# Patient Record
Sex: MALE | Race: WHITE | HISPANIC OR LATINO
[De-identification: names, ages, dates, MRNs, and addresses within clinical notes are randomized per-mention and may not be internally consistent; named-entity substitution may affect disease eponyms.]

---
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
## 2022-01-21 ENCOUNTER — APPOINTMENT (OUTPATIENT)
Dept: INTERNAL MEDICINE | Facility: CLINIC | Age: 22
End: 2022-01-21
Payer: MEDICAID

## 2022-01-21 VITALS
HEART RATE: 109 BPM | HEIGHT: 68 IN | WEIGHT: 130 LBS | SYSTOLIC BLOOD PRESSURE: 123 MMHG | TEMPERATURE: 97.7 F | OXYGEN SATURATION: 97 % | BODY MASS INDEX: 19.7 KG/M2 | DIASTOLIC BLOOD PRESSURE: 79 MMHG

## 2022-01-21 DIAGNOSIS — R09.89 OTHER SPECIFIED SYMPTOMS AND SIGNS INVOLVING THE CIRCULATORY AND RESPIRATORY SYSTEMS: ICD-10-CM

## 2022-01-21 PROCEDURE — 90686 IIV4 VACC NO PRSV 0.5 ML IM: CPT

## 2022-01-21 PROCEDURE — 99395 PREV VISIT EST AGE 18-39: CPT | Mod: 25

## 2022-01-21 PROCEDURE — G0008: CPT

## 2022-01-21 NOTE — ASSESSMENT
[FreeTextEntry1] : Blood work done today, mucus pooling may be secondary to postnasal drip less likely GERD previous ENT note reviewed advised to continue with Flonase will attempt trial of PPI as well.\par Influenza administered patient tolerated procedure well

## 2022-01-21 NOTE — PHYSICAL EXAM
[Urethral Meatus] : meatus normal [Urinary Bladder Findings] : the bladder was normal on palpation [Scrotum] : the scrotum was normal [Rectal Exam - Seminal Vesicles] : the seminal vesicles were normal [Epididymis] : the epididymides were normal [Testes Tenderness] : no tenderness of the testes [Testes Mass (___cm)] : there were no testicular masses [Normal] : no joint swelling and grossly normal strength and tone

## 2022-01-21 NOTE — HISTORY OF PRESENT ILLNESS
[FreeTextEntry1] : 21-year-old male presents to the office for annual physical [de-identified] : Has been having mucus in the throat, states green sometimes difficult to break up\par Denies any fevers chills night sweats\par Denies any reflux does have some allergies\par Denies any difficulty breathing coughing wheezing

## 2022-01-27 ENCOUNTER — TRANSCRIPTION ENCOUNTER (OUTPATIENT)
Age: 22
End: 2022-01-27

## 2022-01-27 LAB
25(OH)D3 SERPL-MCNC: 24.6 NG/ML
ALBUMIN SERPL ELPH-MCNC: 5 G/DL
ALP BLD-CCNC: 126 U/L
ALT SERPL-CCNC: 10 U/L
ANION GAP SERPL CALC-SCNC: 18 MMOL/L
APPEARANCE: CLEAR
APPEARANCE: CLEAR
AST SERPL-CCNC: 16 U/L
BACTERIA: NEGATIVE
BASOPHILS # BLD AUTO: 0.01 K/UL
BASOPHILS NFR BLD AUTO: 0.2 %
BILIRUB SERPL-MCNC: 0.4 MG/DL
BILIRUBIN URINE: NEGATIVE
BILIRUBIN URINE: NEGATIVE
BLOOD URINE: NEGATIVE
BLOOD URINE: NEGATIVE
BUN SERPL-MCNC: 10 MG/DL
CALCIUM SERPL-MCNC: 9.8 MG/DL
CHLORIDE SERPL-SCNC: 105 MMOL/L
CHOLEST SERPL-MCNC: 145 MG/DL
CO2 SERPL-SCNC: 21 MMOL/L
COLOR: COLORLESS
COLOR: COLORLESS
CREAT SERPL-MCNC: 0.89 MG/DL
EOSINOPHIL # BLD AUTO: 0.07 K/UL
EOSINOPHIL NFR BLD AUTO: 1.1 %
ESTIMATED AVERAGE GLUCOSE: 97 MG/DL
GLUCOSE QUALITATIVE U: NEGATIVE
GLUCOSE QUALITATIVE U: NEGATIVE
GLUCOSE SERPL-MCNC: 89 MG/DL
HBA1C MFR BLD HPLC: 5 %
HCT VFR BLD CALC: 49.9 %
HDLC SERPL-MCNC: 65 MG/DL
HGB BLD-MCNC: 16.4 G/DL
HYALINE CASTS: 0 /LPF
IMM GRANULOCYTES NFR BLD AUTO: 0.2 %
KETONES URINE: NEGATIVE
KETONES URINE: NEGATIVE
LDLC SERPL CALC-MCNC: 67 MG/DL
LEUKOCYTE ESTERASE URINE: NEGATIVE
LEUKOCYTE ESTERASE URINE: NEGATIVE
LYMPHOCYTES # BLD AUTO: 1.97 K/UL
LYMPHOCYTES NFR BLD AUTO: 30.8 %
MAN DIFF?: NORMAL
MCHC RBC-ENTMCNC: 30.4 PG
MCHC RBC-ENTMCNC: 32.9 GM/DL
MCV RBC AUTO: 92.6 FL
MICROSCOPIC-UA: NORMAL
MONOCYTES # BLD AUTO: 0.52 K/UL
MONOCYTES NFR BLD AUTO: 8.1 %
NEUTROPHILS # BLD AUTO: 3.81 K/UL
NEUTROPHILS NFR BLD AUTO: 59.6 %
NITRITE URINE: NEGATIVE
NITRITE URINE: NEGATIVE
NONHDLC SERPL-MCNC: 81 MG/DL
PH URINE: 6
PH URINE: 6
PLATELET # BLD AUTO: 259 K/UL
POTASSIUM SERPL-SCNC: 4.2 MMOL/L
PROT SERPL-MCNC: 7.8 G/DL
PROTEIN URINE: NEGATIVE
PROTEIN URINE: NEGATIVE
RBC # BLD: 5.39 M/UL
RBC # FLD: 11.9 %
RED BLOOD CELLS URINE: 0 /HPF
SODIUM SERPL-SCNC: 144 MMOL/L
SPECIFIC GRAVITY URINE: 1.01
SPECIFIC GRAVITY URINE: 1.01
SQUAMOUS EPITHELIAL CELLS: 0 /HPF
TRIGL SERPL-MCNC: 69 MG/DL
TSH SERPL-ACNC: 2.53 UIU/ML
UROBILINOGEN URINE: NORMAL
UROBILINOGEN URINE: NORMAL
VIT B12 SERPL-MCNC: 651 PG/ML
WBC # FLD AUTO: 6.39 K/UL
WHITE BLOOD CELLS URINE: 0 /HPF

## 2022-06-10 ENCOUNTER — APPOINTMENT (OUTPATIENT)
Dept: INTERNAL MEDICINE | Facility: CLINIC | Age: 22
End: 2022-06-10
Payer: MEDICAID

## 2022-06-10 VITALS
TEMPERATURE: 95.6 F | WEIGHT: 124.43 LBS | DIASTOLIC BLOOD PRESSURE: 60 MMHG | HEIGHT: 68.11 IN | BODY MASS INDEX: 18.86 KG/M2 | SYSTOLIC BLOOD PRESSURE: 98 MMHG | HEART RATE: 108 BPM | OXYGEN SATURATION: 98 %

## 2022-06-10 DIAGNOSIS — Z63.4 DISAPPEARANCE AND DEATH OF FAMILY MEMBER: ICD-10-CM

## 2022-06-10 DIAGNOSIS — R21 RASH AND OTHER NONSPECIFIC SKIN ERUPTION: ICD-10-CM

## 2022-06-10 PROCEDURE — 99213 OFFICE O/P EST LOW 20 MIN: CPT

## 2022-06-10 SDOH — SOCIAL STABILITY - SOCIAL INSECURITY: DISSAPEARANCE AND DEATH OF FAMILY MEMBER: Z63.4

## 2022-06-10 NOTE — ASSESSMENT
[FreeTextEntry1] : -Name of psychiatrist provided.\par -Cough maybe secondary to post viral etiology, reflux, or  allergies. Less likely cough variant asthma. CXR ordered.\par -Hypopigmented patch on the penis possibly eczematous?  Continue hydrocortisone cream as well improvement no signs of any infection or STI, denies any sexual activity

## 2022-06-10 NOTE — PHYSICAL EXAM
[Normal] : soft, non-tender, non-distended, no masses palpated, no HSM and normal bowel sounds [Clear to Auscultation] : lungs were clear to auscultation bilaterally [de-identified] : Hypopigmented patch on penis. [de-identified] : Hypopigmented patch on penis.

## 2022-06-10 NOTE — ADDENDUM
[FreeTextEntry1] : I, Merlin Alex, acted as a scribe on behalf of Dr. Ed Abdi MD, on 06/10/2022. \par \par All medical entries made by the scribe were at my, Dr. Ed Abdi MD, direction and personally dictated by me on 06/10/2022. I have reviewed the chart and agree that the record accurately reflects my personal performance of the history, physical exam, assessment and plan. I have also personally directed, reviewed, and agreed with the chart.

## 2022-06-10 NOTE — HISTORY OF PRESENT ILLNESS
[FreeTextEntry1] : Patient presents for follow-up.  [de-identified] : Patient reports he went to urgent care for Cough, headache and phlegm production, suspected to be Covid-19.\par He tested negative for covid-19 and was diagnosed with allergy about 1-2 months ago.\par Urgent doctor advised him about wheezing during auscultation.\par Pt went back to urgent care to test for Covid-19. He tested negative and was told that he have may developed bronchitis. \par He was prescribed with Azithromycin.\par \par Cough has been better, however unsure of wheezing sounds.\par Breathing has been stable. Has had nocturnal awakenings secondary to nasal clearing. Denies Hx of asthma\par Denies body aches, fever chills.\par \par Pt also reports of father passing away. He is requesting for psychiatry referral.\par Denies alcohol use, drug use or thoughts of self-harm.

## 2022-06-15 ENCOUNTER — APPOINTMENT (OUTPATIENT)
Dept: OTOLARYNGOLOGY | Facility: CLINIC | Age: 22
End: 2022-06-15

## 2022-06-17 ENCOUNTER — TRANSCRIPTION ENCOUNTER (OUTPATIENT)
Age: 22
End: 2022-06-17

## 2022-06-21 ENCOUNTER — APPOINTMENT (OUTPATIENT)
Dept: OTOLARYNGOLOGY | Facility: CLINIC | Age: 22
End: 2022-06-21
Payer: MEDICAID

## 2022-06-21 VITALS
DIASTOLIC BLOOD PRESSURE: 73 MMHG | SYSTOLIC BLOOD PRESSURE: 106 MMHG | TEMPERATURE: 97.9 F | HEART RATE: 66 BPM | WEIGHT: 125 LBS | BODY MASS INDEX: 18.94 KG/M2 | HEIGHT: 68.11 IN

## 2022-06-21 PROCEDURE — 99213 OFFICE O/P EST LOW 20 MIN: CPT | Mod: 25

## 2022-06-21 PROCEDURE — 31231 NASAL ENDOSCOPY DX: CPT

## 2022-06-21 RX ORDER — AZITHROMYCIN 250 MG/1
250 TABLET, FILM COATED ORAL
Qty: 6 | Refills: 0 | Status: DISCONTINUED | COMMUNITY
Start: 2022-05-21

## 2022-06-21 RX ORDER — BROMPHENIRAMINE MALEATE, DEXTROMETHORPHAN HBR, PHENYLEPHRINE HCL 2; 10; 5 MG/10ML; MG/10ML; MG/10ML
2.5-1-5 SOLUTION ORAL
Qty: 236 | Refills: 0 | Status: DISCONTINUED | COMMUNITY
Start: 2022-05-21

## 2022-06-21 RX ORDER — PREDNISONE 20 MG/1
20 TABLET ORAL
Qty: 10 | Refills: 0 | Status: DISCONTINUED | COMMUNITY
Start: 2022-05-12

## 2022-06-21 NOTE — HISTORY OF PRESENT ILLNESS
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

## 2022-09-02 NOTE — PROGRESS NOTE PEDS - SUBJECTIVE AND OBJECTIVE BOX
Interval/Overnight Events:    ===========================RESPIRATORY==========================  RR: 16 (06-30-21 @ 04:40) (15 - 26)  SpO2: 97% (06-30-21 @ 04:40) (94% - 100%)  End [place]idal CO2:    Respiratory Support:   [ ] Inhaled Nitric Oxide:    [x] Airway Clearance Discussed  Extubation Readiness:  [ ] Not Applicable     [ ] Discussed and Assessed  Comments:    =========================CARDIOVASCULAR========================  HR: 70 (06-30-21 @ 04:40) (70 - 93)  BP: 114/59 (06-30-21 @ 04:40) (109/56 - 118/68)  ABP: 132/65 (06-30-21 @ 04:40) (115/64 - 132/65)  CVP(mm Hg): --  NIRS:    Patient Care Access:  Comments:    =====================HEMATOLOGY/ONCOLOGY=====================  Transfusions:	[ ] PRBC	[ ] Platelets	[ ] FFP		[ ] Cryoprecipitate  DVT Prophylaxis:  heparin   Infusion - Pediatric 0.043 Unit(s)/kG/Hr IV Continuous <Continuous>  Comments:    ========================INFECTIOUS DISEASE=======================  T(C): 37.3 (06-30-21 @ 04:40), Max: 37.3 (06-30-21 @ 04:40)  T(F): 99.1 (06-30-21 @ 04:40), Max: 99.1 (06-30-21 @ 04:40)  [ ] Cooling [place] being used. Target Temperature:      ==================FLUIDS/ELECTROLYTES/NUTRITION=================  I&O's Summary    29 Jun 2021 07:01  -  30 Jun 2021 07:00  --------------------------------------------------------  IN: 772 mL / OUT: 776 mL / NET: -4 mL      Diet:   [ ] NGT		[ ] NDT		[ ] GT		[ ] GJT    Comments:    ==========================NEUROLOGY===========================  [ ] SBS:		[ ] CHIKI-1:	[ ] BIS:	[ ] CAPD:  acetaminophen   Oral Tab/Cap - Peds. 650 milliGRAM(s) Oral every 6 hours PRN  ondansetron IV Intermittent - Peds 8 milliGRAM(s) IV Intermittent every 8 hours PRN  [x] Adequacy of sedation and pain control has been assessed and adjusted  Comments:    OTHER MEDICATIONS:    =========================PATIENT CARE==========================  [ ] There are pressure ulcers/areas of breakdown that are being addressed.  [x] Preventative measures are being taken to decrease risk for skin breakdown.  [x] Necessity of urinary, arterial, and venous catheters discussed    =========================PHYSICAL EXAM=========================  GENERAL: In no acute distress  RESPIRATORY: Lungs clear to auscultation bilaterally. Good aeration. No rales, rhonchi, retractions or wheezing. Effort even and unlabored.  CARDIOVASCULAR: Regular rate and rhythm. Normal S1/S2. No murmurs, rubs, or gallop. Capillary refill < 2 seconds. Distal pulses 2+ and equal.  ABDOMEN: Soft, non-distended. Bowel sounds present. No palpable hepatosplenomegaly.  SKIN: No rash.  EXTREMITIES: Warm and well perfused. No gross extremity deformities.  NEUROLOGIC: Alert and oriented. No acute change from baseline exam.    ===============================================================  LABS:  ABG - ( 29 Jun 2021 10:58 )  pH: 7.43  /  pCO2: 35    /  pO2: 184   / HCO3: 24    / Base Excess: -0.8  /  SaO2: 99.7  / Lactate: x                                                10.8                  Neurophils% (auto):   83.6   (06-29 @ 21:49):    10.46)-----------(182          Lymphocytes% (auto):  5.7                                           33.3                   Eosinphils% (auto):   0.0      Manual%: Neutrophils x    ; Lymphocytes x    ; Eosinophils x    ; Bands%: x    ; Blasts x        ( 06-29 @ 21:49 )   PT: 15.4 sec;   INR: 1.36 ratio  aPTT: 34.1 sec                            x      |  x      |  x                   Calcium: x     / iCa: 1.21   (06-30 @ 04:28)    ----------------------------<  x         Magnesium: x                                x       |  x      |  x                Phosphorous: x        TPro  4.3    /  Alb  2.7    /  TBili  0.3    /  DBili  x      /  AST  11     /  ALT  6      /  AlkPhos  58     29 Jun 2021 21:49  RECENT CULTURES:      IMAGING STUDIES:    Parent/Guardian is at the bedside:	[ ] Yes	[ ] No  Patient and Parent/Guardian updated as to the progress/plan of care:	[ ] Yes	[ ] No    [ ] The patient remains in critical and unstable condition, and requires ICU care and monitoring, total critical care time spent by myself, the attending physician was __ minutes, excluding procedure time.  [ ] The patient is improving but requires continued monitoring and adjustment of therapy Interval/Overnight Events: given calcium gluconate and KCl bolus for low electrolytes, minimal pain.    ===========================RESPIRATORY==========================  RR: 16 (06-30-21 @ 04:40) (15 - 26)  SpO2: 97% (06-30-21 @ 04:40) (94% - 100%)    Respiratory Support:   room air    [x] Airway Clearance Discussed  Extubation Readiness:  [ x] Not Applicable     [ ] Discussed and Assessed    =========================CARDIOVASCULAR========================  HR: 70 (06-30-21 @ 04:40) (70 - 93)  BP: 114/59 (06-30-21 @ 04:40) (109/56 - 118/68)  ABP: 132/65 (06-30-21 @ 04:40) (115/64 - 132/65)  CVP(mm Hg): --  NIRS:    Patient Care Access: 2 PIVs    =====================HEMATOLOGY/ONCOLOGY=====================    heparin   Infusion - Pediatric 0.043 Unit(s)/kG/Hr IV Continuous <Continuous>  Comments:    ========================INFECTIOUS DISEASE=======================  T(C): 37.3 (06-30-21 @ 04:40), Max: 37.3 (06-30-21 @ 04:40)  T(F): 99.1 (06-30-21 @ 04:40), Max: 99.1 (06-30-21 @ 04:40)  [ ] Cooling [place] being used. Target Temperature:      ==================FLUIDS/ELECTROLYTES/NUTRITION=================  I&O's Summary    29 Jun 2021 07:01  -  30 Jun 2021 07:00  --------------------------------------------------------  IN: 772 mL / OUT: 776 mL / NET: -4 mL      Diet:   [ ] NGT		[ ] NDT		[ ] GT		[ ] GJT    Comments:    ==========================NEUROLOGY===========================  [ ] SBS:		[ ] CHIKI-1:	[ ] BIS:	[ ] CAPD:  acetaminophen   Oral Tab/Cap - Peds. 650 milliGRAM(s) Oral every 6 hours PRN  ondansetron IV Intermittent - Peds 8 milliGRAM(s) IV Intermittent every 8 hours PRN  [x] Adequacy of sedation and pain control has been assessed and adjusted  Comments:    OTHER MEDICATIONS:    =========================PATIENT CARE==========================  [ ] no skin injury  [x] Preventative measures are being taken to decrease risk for skin breakdown.  [x] Necessity of urinary, arterial, and venous catheters discussed    =========================PHYSICAL EXAM=========================  GENERAL: In no acute distress  RESPIRATORY: Lungs clear to auscultation bilaterally. Good aeration. No rales, rhonchi, retractions or wheezing. Effort even and unlabored. nares with some crusting open and patent  CARDIOVASCULAR: Regular rate and rhythm. Normal S1/S2. No murmurs, rubs, or gallop. Capillary refill < 2 seconds. Distal pulses 2+ and equal.  ABDOMEN: Soft, non-distended. Bowel sounds present. No palpable hepatosplenomegaly.  SKIN: No rash. 2 PIVs  EXTREMITIES: Warm and well perfused. No gross extremity deformities.  NEUROLOGIC: Alert and oriented. No acute change from baseline exam.    ===============================================================  LABS:  ABG - ( 29 Jun 2021 10:58 )  pH: 7.43  /  pCO2: 35    /  pO2: 184   / HCO3: 24    / Base Excess: -0.8  /  SaO2: 99.7  / Lactate: x                                                10.8                  Neurophils% (auto):   83.6   (06-29 @ 21:49):    10.46)-----------(182          Lymphocytes% (auto):  5.7                                           33.3                   Eosinphils% (auto):   0.0      Manual%: Neutrophils x    ; Lymphocytes x    ; Eosinophils x    ; Bands%: x    ; Blasts x        ( 06-29 @ 21:49 )   PT: 15.4 sec;   INR: 1.36 ratio  aPTT: 34.1 sec                            x      |  x      |  x                   Calcium: x     / iCa: 1.21   (06-30 @ 04:28)    ----------------------------<  x         Magnesium: x                                x       |  x      |  x                Phosphorous: x        TPro  4.3    /  Alb  2.7    /  TBili  0.3    /  DBili  x      /  AST  11     /  ALT  6      /  AlkPhos  58     29 Jun 2021 21:49  RECENT CULTURES:      IMAGING STUDIES:    Parent/Guardian is at the bedside:	[x ] Yes	[ ] No  Patient and Parent/Guardian updated as to the progress/plan of care:	x[ ] Yes	[ ] No    [ ] The patient remains in critical and unstable condition, and requires ICU care and monitoring, total critical care time spent by myself, the attending physician was __ minutes, excluding procedure time.  [ ] The patient is improving but requires continued monitoring and adjustment of therapy Hydroxychloroquine Counseling:  I discussed with the patient that a baseline ophthalmologic exam is needed at the start of therapy and every year thereafter while on therapy. A CBC may also be warranted for monitoring.  The side effects of this medication were discussed with the patient, including but not limited to agranulocytosis, aplastic anemia, seizures, rashes, retinopathy, and liver toxicity. Patient instructed to call the office should any adverse effect occur.  The patient verbalized understanding of the proper use and possible adverse effects of Plaquenil.  All the patient's questions and concerns were addressed.

## 2022-09-23 ENCOUNTER — APPOINTMENT (OUTPATIENT)
Dept: RADIOLOGY | Facility: CLINIC | Age: 22
End: 2022-09-23

## 2022-09-23 ENCOUNTER — OUTPATIENT (OUTPATIENT)
Dept: OUTPATIENT SERVICES | Facility: HOSPITAL | Age: 22
LOS: 1 days | End: 2022-09-23
Payer: MEDICAID

## 2022-09-23 DIAGNOSIS — Z00.8 ENCOUNTER FOR OTHER GENERAL EXAMINATION: ICD-10-CM

## 2022-09-23 DIAGNOSIS — R05.3 CHRONIC COUGH: ICD-10-CM

## 2022-09-23 PROCEDURE — 71046 X-RAY EXAM CHEST 2 VIEWS: CPT

## 2022-09-23 PROCEDURE — 71046 X-RAY EXAM CHEST 2 VIEWS: CPT | Mod: 26

## 2022-12-08 ENCOUNTER — TRANSCRIPTION ENCOUNTER (OUTPATIENT)
Age: 22
End: 2022-12-08

## 2022-12-09 ENCOUNTER — APPOINTMENT (OUTPATIENT)
Dept: INTERNAL MEDICINE | Facility: CLINIC | Age: 22
End: 2022-12-09

## 2022-12-09 PROCEDURE — 36415 COLL VENOUS BLD VENIPUNCTURE: CPT

## 2022-12-11 LAB
MEV IGG FLD QL IA: <5 AU/ML
MEV IGG+IGM SER-IMP: NEGATIVE
MUV AB SER-ACNC: POSITIVE
MUV IGG SER QL IA: 26.7 AU/ML
RUBV IGG FLD-ACNC: 4.2 INDEX
RUBV IGG SER-IMP: POSITIVE

## 2022-12-13 ENCOUNTER — APPOINTMENT (OUTPATIENT)
Dept: INTERNAL MEDICINE | Facility: CLINIC | Age: 22
End: 2022-12-13

## 2022-12-13 DIAGNOSIS — Z78.9 OTHER SPECIFIED HEALTH STATUS: ICD-10-CM

## 2022-12-13 PROCEDURE — 90471 IMMUNIZATION ADMIN: CPT

## 2022-12-13 PROCEDURE — 90707 MMR VACCINE SC: CPT

## 2022-12-22 NOTE — DISCHARGE NOTE NURSING/CASE MANAGEMENT/SOCIAL WORK - DATE OF LAST VACCINATION
Problem: Discharge Planning  Goal: Discharge to home or other facility with appropriate resources  Outcome: Progressing  Flowsheets (Taken 12/21/2022 1934)  Discharge to home or other facility with appropriate resources:   Identify barriers to discharge with patient and caregiver   Arrange for needed discharge resources and transportation as appropriate   Identify discharge learning needs (meds, wound care, etc)     Problem: Safety - Adult  Goal: Free from fall injury  Outcome: Progressing  Flowsheets (Taken 12/21/2022 1934)  Free From Fall Injury: Instruct family/caregiver on patient safety     Problem: Nutrition Deficit:  Goal: Optimize nutritional status  12/21/2022 1934 by Kelsie Dickson RN  Outcome: Progressing  Flowsheets (Taken 12/21/2022 1404 by Pascual Bermeo RD, LD)  Nutrient intake appropriate for improving, restoring, or maintaining nutritional needs:   Monitor oral intake, labs, and treatment plans   Recommend appropriate diets, oral nutritional supplements, and vitamin/mineral supplements  12/21/2022 1404 by Pascual Bermeo RD, LD  Flowsheets (Taken 12/21/2022 1404)  Nutrient intake appropriate for improving, restoring, or maintaining nutritional needs:   Monitor oral intake, labs, and treatment plans   Recommend appropriate diets, oral nutritional supplements, and vitamin/mineral supplements  Note: Nutrition Problem #1: Altered nutrition-related lab values  Intervention: Food and/or Nutrient Delivery: Modify Current Diet       Problem: Respiratory - Adult  Goal: Achieves optimal ventilation and oxygenation  Outcome: Progressing  Flowsheets (Taken 12/21/2022 1934)  Achieves optimal ventilation and oxygenation:   Assess for changes in respiratory status   Assess for changes in mentation and behavior   Position to facilitate oxygenation and minimize respiratory effort   Oxygen supplementation based on oxygen saturation or arterial blood gases   Encourage broncho-pulmonary hygiene including cough, deep breathe, incentive spirometry   Assess the need for suctioning and aspirate as needed   Assess and instruct to report shortness of breath or any respiratory difficulty   Respiratory therapy support as indicated     Problem: Infection - Adult  Goal: Absence of infection at discharge  Outcome: Progressing  Flowsheets (Taken 12/21/2022 1934)  Absence of infection at discharge:   Assess and monitor for signs and symptoms of infection   Monitor lab/diagnostic results   Monitor all insertion sites i.e., indwelling lines, tubes and drains   [place] appropriate cooling/warming therapies per order   Administer medications as ordered   Instruct and encourage patient and family to use good hand hygiene technique   Identify and instruct in appropriate isolation precautions for identified infection/condition  Goal: Absence of infection during hospitalization  Outcome: Progressing  Flowsheets (Taken 12/21/2022 1934)  Absence of infection during hospitalization:   Assess and monitor for signs and symptoms of infection   Monitor lab/diagnostic results   Monitor all insertion sites i.e., indwelling lines, tubes and drains   [place] appropriate cooling/warming therapies per order   Administer medications as ordered   Instruct and encourage patient and family to use good hand hygiene technique   Identify and instruct in appropriate isolation precautions for identified infection/condition  Goal: Absence of fever/infection during anticipated neutropenic period  Outcome: Progressing  Flowsheets (Taken 12/21/2022 1934)  Absence of fever/infection during anticipated neutropenic period: Monitor white blood cell count 11-Jun-2021

## 2023-06-14 ENCOUNTER — APPOINTMENT (OUTPATIENT)
Dept: OTOLARYNGOLOGY | Facility: CLINIC | Age: 23
End: 2023-06-14
Payer: MEDICAID

## 2023-06-14 ENCOUNTER — TRANSCRIPTION ENCOUNTER (OUTPATIENT)
Age: 23
End: 2023-06-14

## 2023-06-14 VITALS
WEIGHT: 125 LBS | TEMPERATURE: 98 F | BODY MASS INDEX: 18.94 KG/M2 | OXYGEN SATURATION: 100 % | HEIGHT: 68.11 IN | HEART RATE: 90 BPM | DIASTOLIC BLOOD PRESSURE: 72 MMHG | SYSTOLIC BLOOD PRESSURE: 131 MMHG

## 2023-06-14 PROCEDURE — 99214 OFFICE O/P EST MOD 30 MIN: CPT | Mod: 25

## 2023-06-14 PROCEDURE — 31231 NASAL ENDOSCOPY DX: CPT

## 2023-06-14 RX ORDER — FLUTICASONE PROPIONATE 50 UG/1
50 SPRAY, METERED NASAL
Qty: 1 | Refills: 3 | Status: DISCONTINUED | COMMUNITY
Start: 2022-06-21 | End: 2023-06-14

## 2023-06-14 RX ORDER — FAMOTIDINE 40 MG/1
40 TABLET, FILM COATED ORAL DAILY
Qty: 90 | Refills: 0 | Status: DISCONTINUED | COMMUNITY
Start: 2022-09-28 | End: 2023-06-14

## 2023-06-14 RX ORDER — FLUTICASONE PROPIONATE 50 UG/1
50 SPRAY, METERED NASAL
Qty: 1 | Refills: 3 | Status: DISCONTINUED | COMMUNITY
Start: 2021-12-15 | End: 2023-06-14

## 2023-06-14 RX ORDER — OMEPRAZOLE 40 MG/1
40 CAPSULE, DELAYED RELEASE ORAL
Qty: 90 | Refills: 3 | Status: DISCONTINUED | COMMUNITY
Start: 2022-01-21 | End: 2023-06-14

## 2023-06-14 NOTE — HISTORY OF PRESENT ILLNESS
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

## 2023-06-16 ENCOUNTER — TRANSCRIPTION ENCOUNTER (OUTPATIENT)
Age: 23
End: 2023-06-16

## 2023-06-19 ENCOUNTER — TRANSCRIPTION ENCOUNTER (OUTPATIENT)
Age: 23
End: 2023-06-19

## 2023-06-20 LAB — EAR NOSE AND THROAT CULTURE: ABNORMAL

## 2023-06-22 ENCOUNTER — LABORATORY RESULT (OUTPATIENT)
Age: 23
End: 2023-06-22

## 2023-06-22 ENCOUNTER — APPOINTMENT (OUTPATIENT)
Dept: INTERNAL MEDICINE | Facility: CLINIC | Age: 23
End: 2023-06-22
Payer: MEDICAID

## 2023-06-22 VITALS
HEIGHT: 68 IN | TEMPERATURE: 98.6 F | OXYGEN SATURATION: 98 % | DIASTOLIC BLOOD PRESSURE: 66 MMHG | BODY MASS INDEX: 20.16 KG/M2 | HEART RATE: 81 BPM | WEIGHT: 133 LBS | SYSTOLIC BLOOD PRESSURE: 107 MMHG

## 2023-06-22 DIAGNOSIS — R19.5 OTHER FECAL ABNORMALITIES: ICD-10-CM

## 2023-06-22 DIAGNOSIS — L65.9 NONSCARRING HAIR LOSS, UNSPECIFIED: ICD-10-CM

## 2023-06-22 DIAGNOSIS — J38.7 OTHER DISEASES OF LARYNX: ICD-10-CM

## 2023-06-22 DIAGNOSIS — Z00.00 ENCOUNTER FOR GENERAL ADULT MEDICAL EXAMINATION W/OUT ABNORMAL FINDINGS: ICD-10-CM

## 2023-06-22 PROCEDURE — 99395 PREV VISIT EST AGE 18-39: CPT | Mod: 25

## 2023-06-22 PROCEDURE — 36415 COLL VENOUS BLD VENIPUNCTURE: CPT

## 2023-06-22 PROCEDURE — 99213 OFFICE O/P EST LOW 20 MIN: CPT | Mod: 25

## 2023-06-23 PROBLEM — L65.9 HAIR LOSS: Status: ACTIVE | Noted: 2023-06-23

## 2023-06-23 NOTE — HEALTH RISK ASSESSMENT
[Good] : ~his/her~  mood as  good [FreeTextEntry1] : pooling of mucus, loose BM's, hair loss [No] : No [0] : 2) Feeling down, depressed, or hopeless: Not at all (0) [PHQ-2 Negative - No further assessment needed] : PHQ-2 Negative - No further assessment needed [de-identified] : ENT [[id number]] : 0

## 2023-06-23 NOTE — ASSESSMENT
[FreeTextEntry1] : Work done today, mucus plan to be secondary to laryngeal reflux, will attempt Pepcid for 2 to 4 weeks if no improvement name of GI provided for loose bowel movements we will check celiac serology, will check inflammatory markers possibly IBS possibly lactose intolerance.  Advise dermatology consult for hair loss, will check thyroid function test today, will assess for any anemia.  Discussed different treatment options including finasteride Rogaine.  Patient to consider.

## 2023-06-23 NOTE — HEALTH RISK ASSESSMENT
[Good] : ~his/her~  mood as  good [FreeTextEntry1] : pooling of mucus, loose BM's, hair loss [No] : No [0] : 2) Feeling down, depressed, or hopeless: Not at all (0) [PHQ-2 Negative - No further assessment needed] : PHQ-2 Negative - No further assessment needed [de-identified] : ENT [[id number]] : 0

## 2023-06-23 NOTE — HISTORY OF PRESENT ILLNESS
[FreeTextEntry1] : Patient presents to the office for annual physical [de-identified] : Patient presents to the office for annual physical, \par Has noticed saliva pooling particularly in the morning did recently see ENT does have some postnasal drip since surgery.  Does feel a burning sensation sometimes going up to the throat.  Denies any difficulty swallowing nausea vomiting sometimes does get loose bowel movements denies any blood in the stool denies any nocturnal awakening secondary to diarrhea.  Denies any increase stress.  Does have some concern about hair loss does have a family history of hair loss.  Does feel that hair is thinner.

## 2023-06-23 NOTE — HISTORY OF PRESENT ILLNESS
[FreeTextEntry1] : Patient presents to the office for annual physical [de-identified] : Patient presents to the office for annual physical, \par Has noticed saliva pooling particularly in the morning did recently see ENT does have some postnasal drip since surgery.  Does feel a burning sensation sometimes going up to the throat.  Denies any difficulty swallowing nausea vomiting sometimes does get loose bowel movements denies any blood in the stool denies any nocturnal awakening secondary to diarrhea.  Denies any increase stress.  Does have some concern about hair loss does have a family history of hair loss.  Does feel that hair is thinner.

## 2023-06-28 LAB
25(OH)D3 SERPL-MCNC: 25 NG/ML
ALBUMIN SERPL ELPH-MCNC: 5 G/DL
ALP BLD-CCNC: 116 U/L
ALT SERPL-CCNC: 19 U/L
ANION GAP SERPL CALC-SCNC: 14 MMOL/L
APPEARANCE: ABNORMAL
AST SERPL-CCNC: 26 U/L
BAKER'S YEAST AB QL: 12 UNITS
BAKER'S YEAST IGA QL IA: 9.8 UNITS
BAKER'S YEAST IGA QN IA: NEGATIVE
BAKER'S YEAST IGG QN IA: NEGATIVE
BILIRUB SERPL-MCNC: 0.6 MG/DL
BILIRUBIN URINE: NEGATIVE
BLOOD URINE: NEGATIVE
BUN SERPL-MCNC: 11 MG/DL
CALCIUM SERPL-MCNC: 10 MG/DL
CELIACPAN: NORMAL
CHLORIDE SERPL-SCNC: 102 MMOL/L
CHOLEST SERPL-MCNC: 127 MG/DL
CO2 SERPL-SCNC: 25 MMOL/L
COLOR: YELLOW
CREAT SERPL-MCNC: 0.88 MG/DL
EGFR: 126 ML/MIN/1.73M2
ESTIMATED AVERAGE GLUCOSE: 108 MG/DL
GLUCOSE QUALITATIVE U: NEGATIVE MG/DL
GLUCOSE SERPL-MCNC: 88 MG/DL
HBA1C MFR BLD HPLC: 5.4 %
HDLC SERPL-MCNC: 63 MG/DL
KETONES URINE: NEGATIVE MG/DL
LDLC SERPL CALC-MCNC: 50 MG/DL
LEUKOCYTE ESTERASE URINE: NEGATIVE
NITRITE URINE: NEGATIVE
NONHDLC SERPL-MCNC: 64 MG/DL
PH URINE: 8
POTASSIUM SERPL-SCNC: 4.4 MMOL/L
PROT SERPL-MCNC: 7.6 G/DL
PROTEIN URINE: NEGATIVE MG/DL
SODIUM SERPL-SCNC: 141 MMOL/L
SPECIFIC GRAVITY URINE: 1.02
TRIGL SERPL-MCNC: 71 MG/DL
TSH SERPL-ACNC: 2.18 UIU/ML
UROBILINOGEN URINE: 0.2 MG/DL
VIT B12 SERPL-MCNC: 647 PG/ML

## 2023-07-12 ENCOUNTER — APPOINTMENT (OUTPATIENT)
Dept: MRI IMAGING | Facility: CLINIC | Age: 23
End: 2023-07-12
Payer: MEDICAID

## 2023-07-12 ENCOUNTER — OUTPATIENT (OUTPATIENT)
Dept: OUTPATIENT SERVICES | Facility: HOSPITAL | Age: 23
LOS: 1 days | End: 2023-07-12
Payer: MEDICAID

## 2023-07-12 DIAGNOSIS — D10.6 BENIGN NEOPLASM OF NASOPHARYNX: ICD-10-CM

## 2023-07-12 PROCEDURE — 70543 MRI ORBT/FAC/NCK W/O &W/DYE: CPT | Mod: 26

## 2023-07-12 PROCEDURE — 70543 MRI ORBT/FAC/NCK W/O &W/DYE: CPT

## 2023-07-12 PROCEDURE — A9585: CPT

## 2023-08-15 ENCOUNTER — TRANSCRIPTION ENCOUNTER (OUTPATIENT)
Age: 23
End: 2023-08-15

## 2023-12-13 ENCOUNTER — APPOINTMENT (OUTPATIENT)
Dept: INTERNAL MEDICINE | Facility: CLINIC | Age: 23
End: 2023-12-13
Payer: MEDICAID

## 2023-12-13 VITALS
DIASTOLIC BLOOD PRESSURE: 72 MMHG | TEMPERATURE: 97.3 F | HEART RATE: 76 BPM | HEIGHT: 68 IN | WEIGHT: 132 LBS | BODY MASS INDEX: 20 KG/M2 | OXYGEN SATURATION: 100 % | SYSTOLIC BLOOD PRESSURE: 107 MMHG | RESPIRATION RATE: 14 BRPM

## 2023-12-13 DIAGNOSIS — R79.89 OTHER SPECIFIED ABNORMAL FINDINGS OF BLOOD CHEMISTRY: ICD-10-CM

## 2023-12-13 DIAGNOSIS — R05.3 CHRONIC COUGH: ICD-10-CM

## 2023-12-13 PROCEDURE — 99213 OFFICE O/P EST LOW 20 MIN: CPT

## 2023-12-14 NOTE — ASSESSMENT
[FreeTextEntry1] : GI consult provided would recommend continuation PPI lifestyle modification vitamin D levels to be checked, name of GI provided ENT consult provided

## 2023-12-14 NOTE — HISTORY OF PRESENT ILLNESS
[FreeTextEntry1] : Patient presents for follow-up. [de-identified] : Wishes to do blood work feels well does continue to have some issues with stomach, does have cough phlegm in the throat Denies any shortness of breath abdominal pain nausea vomiting denies any chest pain chest tightness anxiety

## 2023-12-19 LAB
25(OH)D3 SERPL-MCNC: 33.2 NG/ML
ALBUMIN SERPL ELPH-MCNC: 4.5 G/DL
ALP BLD-CCNC: 98 U/L
ALT SERPL-CCNC: 14 U/L
ANION GAP SERPL CALC-SCNC: 12 MMOL/L
APPEARANCE: CLEAR
AST SERPL-CCNC: 26 U/L
BASOPHILS # BLD AUTO: 0.02 K/UL
BASOPHILS NFR BLD AUTO: 0.2 %
BILIRUB SERPL-MCNC: 0.5 MG/DL
BILIRUBIN URINE: NEGATIVE
BLOOD URINE: NEGATIVE
BUN SERPL-MCNC: 10 MG/DL
CALCIUM SERPL-MCNC: 9.3 MG/DL
CHLORIDE SERPL-SCNC: 103 MMOL/L
CHOLEST SERPL-MCNC: 106 MG/DL
CO2 SERPL-SCNC: 27 MMOL/L
COLOR: YELLOW
CREAT SERPL-MCNC: 0.85 MG/DL
EGFR: 127 ML/MIN/1.73M2
EOSINOPHIL # BLD AUTO: 0.08 K/UL
EOSINOPHIL NFR BLD AUTO: 0.8 %
ESTIMATED AVERAGE GLUCOSE: 103 MG/DL
GLUCOSE QUALITATIVE U: NEGATIVE MG/DL
GLUCOSE SERPL-MCNC: 78 MG/DL
HBA1C MFR BLD HPLC: 5.2 %
HCT VFR BLD CALC: 50.1 %
HDLC SERPL-MCNC: 66 MG/DL
HGB BLD-MCNC: 16.4 G/DL
IMM GRANULOCYTES NFR BLD AUTO: 0.2 %
KETONES URINE: NEGATIVE MG/DL
LDLC SERPL CALC-MCNC: 28 MG/DL
LEUKOCYTE ESTERASE URINE: NEGATIVE
LYMPHOCYTES # BLD AUTO: 1.81 K/UL
LYMPHOCYTES NFR BLD AUTO: 19.1 %
MAN DIFF?: NORMAL
MCHC RBC-ENTMCNC: 32.1 PG
MCHC RBC-ENTMCNC: 32.7 GM/DL
MCV RBC AUTO: 98 FL
MONOCYTES # BLD AUTO: 0.82 K/UL
MONOCYTES NFR BLD AUTO: 8.7 %
NEUTROPHILS # BLD AUTO: 6.71 K/UL
NEUTROPHILS NFR BLD AUTO: 71 %
NITRITE URINE: NEGATIVE
NONHDLC SERPL-MCNC: 41 MG/DL
PH URINE: 7
PLATELET # BLD AUTO: 221 K/UL
POTASSIUM SERPL-SCNC: 4.1 MMOL/L
PROT SERPL-MCNC: 7.2 G/DL
PROTEIN URINE: NEGATIVE MG/DL
RBC # BLD: 5.11 M/UL
RBC # FLD: 11.6 %
SODIUM SERPL-SCNC: 142 MMOL/L
SPECIFIC GRAVITY URINE: <1.005
TRIGL SERPL-MCNC: 51 MG/DL
TSH SERPL-ACNC: 2.35 UIU/ML
UROBILINOGEN URINE: 0.2 MG/DL
VIT B12 SERPL-MCNC: 640 PG/ML
WBC # FLD AUTO: 9.46 K/UL

## 2024-02-27 NOTE — H&P PST ADULT - GENITOURINARY
02-21-24  Type of therapy: Psychoeducation, Stress management  Type of session: Group  Level of patient participation: Attentive, Engaged, Participates  Duration of participation: 45 minutes  Therapy conducted by: Social work  Therapy Summary: SWI facilitated healthy stress management tips, as they help maintain a healthy mindset, promote a balanced lifestyle, and foster the use of effective coping skills.   During group time, the patient was observed interacting with other members of the group. The patient is receptive to dialogue regarding tension management techniques. The patient can provide instances of effectively managing stress through the implementation of exercises that may aid in the reduction of his stress.    02-21-24  Type of therapy: Coping skills, Psychoeducation  Type of session: Group  Level of patient participation: Engaged, Participates  Duration of participation: 45 minutes  Therapy conducted by: Social work  Therapy Summary: Coping skills are essential techniques for managing depression symptoms. These techniques, such as behavioral activation, social support, positive journaling, and mindfulness, can help alleviate negative thoughts and improve mood.   Patient participates during group sessions and is able to elaborate by providing examples of coping with depression. Patient stated he practices mindfulness and breathing exercises as coping techniques.    02-21-24  Type of therapy: Dialectical behavior therapy, Coping skills  Type of session: Group  Level of patient participation: Attentive, Engaged, Participates  Duration of participation: 45 minutes  Therapy conducted by: Social work  Therapy Summary: Patient attended the Crisis Skills Group with  and peers. The DBT IMPROVE the Moment Skill was reviewed which identifies strategies recommended in dialectical behavior therapy (DBT) to help one get through emotionally trying situations such as: Imagery, Meaning, Prayer, Relaxation, One thing in the Moment, Vacation and Encouragement. Patients offered support and feedback while  facilitated the group.    Ruy was an active participant. He appeared open to the therapeutic intervention. He agreed with the benefits of using the IMPROVE skill. Patient feels he can use relaxation (listening to podcasts, cooking) to help cope with stressors. He was also able to identify positive aspects of this hospitalization (meaning) such as taking the time to work on his own well being and mental health. He engaged well with peers, remaining in good behavioral control for the duration of the group.    02-21-24  Type of therapy: Other, Discharge Planning  Type of session: Group  Level of patient participation: Attentive, Engaged, Participates  Duration of participation: 45 minutes  Therapy conducted by: Social work  Therapy Summary: Patient attended the discharge planning group with  and peers. Patients discussed their plans for discharge including follow up appointments, effective coping skills and social supports. The importance of medication management was discussed.  answered questions and provided support.    02-22-24  Type of therapy: Cognitive behavior therapy, Coping skills, Psychoeducation  Type of session: Group  Level of patient participation: Attentive, Engaged, Participates  Duration of participation: 45 minutes  Therapy conducted by: Social work  Therapy Summary: SWI facilitated a CBT focuses on understanding the central ideas that underlie thoughts and behaviors. Core beliefs, which are central ideas about oneself, others, and the world, are crucial in guiding thoughts and behaviors, influencing life experiences.   Ruy participates and is able to stay engaged with the discussions. Patient was observed engaging with another group member. The patient disclosed that he has difficulty with his core beliefs and is aware that these thoughts don't allow him his personal development.    02-22-24  Type of therapy: Mindfulness, Psychoeducation  Type of session: Group  Level of patient participation: Attentive, Engaged, Participates  Duration of participation: 45 minutes  Therapy conducted by: Social work  Therapy Summary: SWI facilitated the practice mindfulness, a [id number] awareness, can lead to improved concentration, reduced anxiety and depression symptoms, and numerous interpersonal benefits. Techniques like meditation, body scan, mindful eating, and five senses can help enhance mindfulness.   Patient participates and engages in topic of discussion. Patient was able to share mindfulness exercises as he reported he practices meditation regularly to help her cope with his symptoms.    02-23-24  Type of therapy: Mindfulness, Psychoeducation  Type of session: Group  Level of patient participation: Engaged, Participates  Duration of participation: 45 minutes  Therapy conducted by: Social work  Therapy Summary: SWI conducted a group discussion on how spending time on self-care activities, such as regular meals, hobbies, and socializing, can help maintain good health and improve well-being, even during periods of stress.   Patient participates and engages in topic of discussion. Patient was able to share mindfulness exercises as he reported he practices meditation regularly to help her cope with his symptoms.    02-23-24  Type of therapy: Medication management, Psychoeducation  Type of session: Group  Level of patient participation: Attentive, Engaged, Participates  Duration of participation: 45 minutes  Therapy conducted by: Social work  Therapy Summary: For many year research has highlighted the significant connection between mental health and exercise, with exercise proven to treat and prevent mental illnesses like depression, anxiety, and substance abuse.   Patient participates and engages in topic of discussion. Patient was able to share mindfulness exercises as he reported he practices meditation and julia arts regularly to help her cope with his symptoms.    02-23-24  Type of therapy: Self esteem, Other, Support  Type of session: Group  Level of patient participation: Attentive, Engaged, Participates  Duration of participation: 45 minutes  Therapy conducted by: Social work  Therapy Summary: Patient attended the Social Work group with SW and peers and were asked to identify times where they have shown positive qualities. Patients were encouraged to consider their strengths and how they can be used to help overcome challenges. The goal of this exercise was to increase self-esteem and positive thoughts. SW facilitated the group and patients provided feedback and support.    02-26-24  Type of therapy: Dialectical behavior therapy, Mindfulness  Type of session: Group  Level of patient participation: Attentive, Engaged, Participates  Duration of participation: 45 minutes  Therapy conducted by: Social work  Therapy Summary: Patient attended the Crisis Skills Group with  and peers. “Urge Surfing” was taught which helps with managing unwanted behaviors by acknowledging the urge, riding it out, managing triggers and using delay and distraction skills. Patients offered support and feedback while  facilitated the group.    Ruy was an active participant. He appeared open to the therapeutic intervention and agreed with the benefits of using urge surfing to help manage unwanted behaviors. He engaged well with peers, remaining in good behavioral control.    02-26-24  Type of therapy: Coping skills, Self esteem  Type of session: Group  Level of patient participation: Attentive, Engaged, Participates  Duration of participation: 45 minutes  Therapy conducted by: Social work  Therapy Summary: Patient attended the Social Work Group with  and peers. The topic of building a routine was discussed, as well as the (mental health) benefits of creating daily structure. Patients were given a sample routine worksheet to complete at their leisure. Patients offered support and feedback while  facilitated the discussion.      Ruy was an active participant. He agreed with the benefits of having a routine and discussed his goal to return to work. Ruy also discussed his desire to remain physically active and return to local martial arts classes. He engaged well with peers, providing feedback and support when appropriate.    02-27-24  Type of therapy: Coping skills, Creative arts therapy, Inspiration and motiviation, Leisure development, Self esteem, Social skills training, Stress management, Symptom management  Type of session: Group  Level of patient participation: Engaged, Participates  Duration of participation: 45 minutes  Therapy conducted by: Psych rehab  Therapy Summary: Pt is attending all RT sessions , mood is improving , pt is well engaged , social with peers , calm and cooperative .     02-12-24  Type of therapy: Coping skills, Psychoeducation  Type of session: Group  Level of patient participation: Attentive, Participated with encouragement  Duration of participation: 45 minutes  Therapy conducted by: Social work  Therapy Summary: Patient attended the Patient Education Group with  and peers. The topic of “Behavioral Activation” was explored which focuses on replacing negative behaviors with positive alternatives.  Patients were asked to consider ways to develop positive replacement behaviors that are both easy and rewarding. Examples include visiting a friend, taking a walk, going for a bike ride, writing a letter, reading a book, etc. Patients were asked to be specific as to what part of the day this behavior can be included. Patients provided support and feedback while  facilitated the discussion.     02-14-24  Type of therapy: Coping skills, Psychoeducation  Type of session: Group  Level of patient participation: Participates  Duration of participation: 45 minutes  Therapy conducted by: Social work  Therapy Summary: Coping skills are essential techniques to manage depression symptoms. These skills can be used during difficult times or consistently daily to improve mood. Techniques include behavioral activation, social support, positive journaling, and mindfulness.   The patient was observed interacting with other group members. The patient engages in the discussion and is receptive to the coping strategies that are presented. The patient disclosed a lack of social support and inability to participate in social activities. The patient maintained engagement and attentiveness throughout the session by responding to multiple questions posed by the facilitator.    02-14-24  Type of therapy: Coping skills, Psychoeducation  Type of session: Group  Level of patient participation: Attentive, Engaged, Participates  Duration of participation: 45 minutes  Therapy conducted by: Social work  Therapy Summary: SWI facilitated a group discussing protective factors, such as strong relationships and healthy coping skills, contributing to mental health and resilience.  In the group session, the patient actively engages and participates. A stable mood was observed in the patient. The patient disclosed his coping mechanisms and provided further details; however, he acknowledged that these mechanisms do not consistently provide him with relief from his symptoms.    02-15-24  Type of therapy: Coping skills, Psychoeducation  Type of session: Group  Level of patient participation: Attentive, Participates  Duration of participation: 45 minutes  Therapy conducted by: Social work  Therapy Summary: SWI facilitated effective coping strategies are a critical aspect of managing stress. These strategies will enable individuals to identify and respond to stressors in a healthy manner, which in turn reduces the negative effects of stress on their overall well-being   Patient participated in group was engaged in topic of discussion. Although quiet patient was observed attentive and stable behavior during group.    02-15-24  Type of therapy: Coping skills, Psychoeducation  Type of session: Group  Level of patient participation: Attentive, Participates  Duration of participation: 45 minutes  Therapy conducted by: Social work  Therapy Summary: SWI facilitated practical guidance for building new habits, focusing on small changes, tying habits to activities, and celebrating successes. This approach aids in behavioral activation, relapse prevention, and self-improvement.   The patient actively engages in group discussions when encouraged and offers effective strategies to develop desired habits in order to accomplish goals. The patient is observed interacting with other members of the group and displaying attentiveness towards the techniques used for approaching them.    02-16-24  Type of therapy: Coping skills, Psychoeducation, Emotional Regulation Skills  Type of session: Group  Level of patient participation: Attentive, Participates  Duration of participation: 45 minutes  Therapy conducted by: Social work  Therapy Summary: Emotion Regulation Skills techniques teach emotion regulation skills, enabling clients to manage emotions effectively and adapt to change, enhancing their ability to cope with challenging situations.   The patient actively engages in group discussions and demonstrates attentiveness. The patient, when asked, can provide instances of effective coping mechanisms as he stated he enjoys cooking and listening to music to relax. The patient is open and responsive to the therapeutic approach.    02-16-24  Type of therapy: Coping skills, Psychoeducation  Type of session: Group  Level of patient participation: Attentive, Participates  Duration of participation: 45 minutes  Therapy conducted by: Social work  Therapy Summary: SWI facilitated a group on the topic of building happiness and how it involves regular practice of activities such as gratitude, acts of kindness, physical exercise, meditation, positive journaling, and fostering relationships.   The patient was actively participating in the group discussion. The patient can offer building happiness-promoting activities. The patient stated that he uses physical activity and meditation to help him control his emotions.    02-16-24  Type of therapy: Coping skills, Inspiration and motiviation, Leisure development, Self esteem, Social skills training, Stress management, Symptom management  Type of session: Group  Level of patient participation: Engaged, Participates  Duration of participation: 45 minutes  Therapy conducted by: Psych rehab  Therapy Summary: Pt has attended most RT sessions , pt is well engaged in playing chess and teaching peers the game , assisting with improved self esteem and social skills.    02-20-24  Type of therapy: Coping skills, Inspiration and motiviation, Leisure development, Self esteem, Stress management, Symptom management  Type of session: Group  Level of patient participation: Engaged, Participates  Duration of participation: 60 minutes  Therapy conducted by: Psych rehab  Therapy Summary: Pt is active during RT sessions , pt is social with peers , mood has been calm , he  continues to enjoy playing chess as well as helping peers engage in the game .   details…

## 2024-02-29 NOTE — H&P PST ADULT - RESPIRATORY AND THORAX
Bactrim  mg-160 mg oral tablet: 1 tab(s) orally 2 times a day  losartan 25 mg oral tablet: 1 tab(s) orally once a day (at bedtime)  
details…

## 2024-03-13 NOTE — H&P PST ADULT - MALLAMPAT[facility] CLASS
BP READINGS -      2/23 9am  124/59 p 60 wt 142  2/24 7:45 m 121/57 p 60 wt 141  2/25 7 am  129/63 p 60  wt 143  2/26 12:45  125/62 p 60  wr 142  2/28  3 pm 132/65 p 60 wr 143  2/29  9 am 130/63 p 60  wt 143  3/1  4 pm 138/64 p 60 wt 141  3/2  8 am 138/64 p 60  wt 143  3/3  9:30 am 128/59 p 60  wt 141  3/4  8:30 am 126/60 p 60  wr 142  3/6 8:35 am  93/49 p 60  wt 143  3/7 8 am 123/63 p 60  wt 141  3/8 7 am 124/64 p 64  wt 140  3/9 8 am 131/59 p 60  wt 142  3/10 8:30 am 137/64 p 60  wt 141  3/11 8 am 131/67 p 63  wt 142  3/12 8:30 am 138/64 p 60  wt 140  3/13 7 am 122/66 p 67  wt 141    Pt will be going out of town tomorrow, should she still continue to track bp?      Class I (easy) - visualization of the soft palate, fauces, uvula, and both anterior and posterior pillars

## 2024-04-08 ENCOUNTER — TRANSCRIPTION ENCOUNTER (OUTPATIENT)
Age: 24
End: 2024-04-08

## 2024-04-09 ENCOUNTER — NON-APPOINTMENT (OUTPATIENT)
Age: 24
End: 2024-04-09

## 2024-04-10 ENCOUNTER — APPOINTMENT (OUTPATIENT)
Dept: OTOLARYNGOLOGY | Facility: CLINIC | Age: 24
End: 2024-04-10
Payer: MEDICAID

## 2024-04-10 VITALS
OXYGEN SATURATION: 96 % | DIASTOLIC BLOOD PRESSURE: 75 MMHG | WEIGHT: 130 LBS | SYSTOLIC BLOOD PRESSURE: 116 MMHG | HEIGHT: 68 IN | BODY MASS INDEX: 19.7 KG/M2 | HEART RATE: 95 BPM

## 2024-04-10 DIAGNOSIS — R09.81 NASAL CONGESTION: ICD-10-CM

## 2024-04-10 DIAGNOSIS — D10.6 BENIGN NEOPLASM OF NASOPHARYNX: ICD-10-CM

## 2024-04-10 DIAGNOSIS — J34.89 OTHER SPECIFIED DISORDERS OF NOSE AND NASAL SINUSES: ICD-10-CM

## 2024-04-10 PROCEDURE — 31231 NASAL ENDOSCOPY DX: CPT

## 2024-04-10 PROCEDURE — 99213 OFFICE O/P EST LOW 20 MIN: CPT | Mod: 25

## 2024-04-10 RX ORDER — FAMOTIDINE 40 MG/1
40 TABLET, FILM COATED ORAL DAILY
Qty: 90 | Refills: 1 | Status: COMPLETED | COMMUNITY
Start: 2023-06-22 | End: 2024-04-10

## 2024-04-10 RX ORDER — SULFAMETHOXAZOLE AND TRIMETHOPRIM 800; 160 MG/1; MG/1
800-160 TABLET ORAL TWICE DAILY
Qty: 20 | Refills: 0 | Status: COMPLETED | COMMUNITY
Start: 2023-06-14 | End: 2024-04-10

## 2024-04-10 RX ORDER — SULFAMETHOXAZOLE AND TRIMETHOPRIM 800; 160 MG/1; MG/1
800-160 TABLET ORAL TWICE DAILY
Qty: 14 | Refills: 0 | Status: COMPLETED | COMMUNITY
Start: 2023-06-16 | End: 2024-04-10

## 2024-04-10 NOTE — HISTORY OF PRESENT ILLNESS
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 MRI Orbit, Face, and/or Neck w/wo IV Cont 7/19/2023 IMPRESSION:  Stable examination. No evidence of tumor recurrence. Doing saline rinses omce a month. Reports frequent PND with green phlegm production mainly in the winter, intermittent nasal congestion, intermittent clear anterior rhinorrhea and intermittent headaches unsure if it is sinus related. Denies recent epistaxis, recent fever or sinus infections. Sense of smell is good  No recent use of OTC allergy meds or nasal sprays.

## 2024-07-13 ENCOUNTER — OUTPATIENT (OUTPATIENT)
Dept: OUTPATIENT SERVICES | Facility: HOSPITAL | Age: 24
LOS: 1 days | End: 2024-07-13
Payer: MEDICAID

## 2024-07-13 ENCOUNTER — APPOINTMENT (OUTPATIENT)
Dept: MRI IMAGING | Facility: CLINIC | Age: 24
End: 2024-07-13
Payer: MEDICAID

## 2024-07-13 DIAGNOSIS — J34.89 OTHER SPECIFIED DISORDERS OF NOSE AND NASAL SINUSES: ICD-10-CM

## 2024-07-13 DIAGNOSIS — Z00.8 ENCOUNTER FOR OTHER GENERAL EXAMINATION: ICD-10-CM

## 2024-07-13 PROCEDURE — 70543 MRI ORBT/FAC/NCK W/O &W/DYE: CPT | Mod: 26

## 2024-07-13 PROCEDURE — 70543 MRI ORBT/FAC/NCK W/O &W/DYE: CPT

## 2024-07-13 PROCEDURE — A9585: CPT

## 2024-08-01 ENCOUNTER — APPOINTMENT (OUTPATIENT)
Dept: INTERNAL MEDICINE | Facility: CLINIC | Age: 24
End: 2024-08-01
Payer: MEDICAID

## 2024-08-01 VITALS
HEART RATE: 112 BPM | SYSTOLIC BLOOD PRESSURE: 128 MMHG | OXYGEN SATURATION: 99 % | HEIGHT: 68 IN | DIASTOLIC BLOOD PRESSURE: 84 MMHG | WEIGHT: 130 LBS | BODY MASS INDEX: 19.7 KG/M2 | TEMPERATURE: 98.3 F

## 2024-08-01 DIAGNOSIS — Z00.00 ENCOUNTER FOR GENERAL ADULT MEDICAL EXAMINATION W/OUT ABNORMAL FINDINGS: ICD-10-CM

## 2024-08-01 DIAGNOSIS — T48.5X5A CHRONIC RHINITIS: ICD-10-CM

## 2024-08-01 DIAGNOSIS — G89.29 LOW BACK PAIN, UNSPECIFIED: ICD-10-CM

## 2024-08-01 DIAGNOSIS — L65.9 NONSCARRING HAIR LOSS, UNSPECIFIED: ICD-10-CM

## 2024-08-01 DIAGNOSIS — Z63.4 DISAPPEARANCE AND DEATH OF FAMILY MEMBER: ICD-10-CM

## 2024-08-01 DIAGNOSIS — M54.50 LOW BACK PAIN, UNSPECIFIED: ICD-10-CM

## 2024-08-01 DIAGNOSIS — Z87.898 PERSONAL HISTORY OF OTHER SPECIFIED CONDITIONS: ICD-10-CM

## 2024-08-01 DIAGNOSIS — J31.0 CHRONIC RHINITIS: ICD-10-CM

## 2024-08-01 PROCEDURE — G0444 DEPRESSION SCREEN ANNUAL: CPT | Mod: 59

## 2024-08-01 PROCEDURE — 99395 PREV VISIT EST AGE 18-39: CPT

## 2024-08-01 PROCEDURE — 36415 COLL VENOUS BLD VENIPUNCTURE: CPT

## 2024-08-01 RX ORDER — FINASTERIDE 1 MG/1
1 TABLET ORAL DAILY
Qty: 90 | Refills: 3 | Status: ACTIVE | COMMUNITY
Start: 2024-08-01 | End: 1900-01-01

## 2024-08-01 SDOH — SOCIAL STABILITY - SOCIAL INSECURITY: DISSAPEARANCE AND DEATH OF FAMILY MEMBER: Z63.4

## 2024-08-02 NOTE — HISTORY OF PRESENT ILLNESS
[FreeTextEntry1] : Patient presents for a comprehensive [de-identified] : Patient is a 23 yr old male present today for a   Patient is doing well overall and has not gotten sick since last visit. Seen by GI, prescribed omeprazole and OTC Pepcid, scheduled to follow-up mid-month to discuss completion of meds. Pt reports watching diet. MRI Face, stable, given s/p right sinonasal mass resection in 2021. Sinuses, still intermittent, worse at night.  Pt reports remaining active by walking.  Pt has no acute concerns. Patient reports everything is stable. Some manageable anxiety or depression.  Denies any CP, chest tightness or SOB. Denies any abdominal pain, urinary symptom, or change in bowel habits. Denies any fever, chills, or night sweats.

## 2024-08-02 NOTE — HEALTH RISK ASSESSMENT
[Good] : ~his/her~  mood as  good [No] : In the past 12 months have you used drugs other than those required for medical reasons? No [Never] : Never [NO] : No [FreeTextEntry1] : health maintenance [0] : 2) Feeling down, depressed, or hopeless: Not at all (0) [PHQ-2 Negative - No further assessment needed] : PHQ-2 Negative - No further assessment needed [[id number]] : 0

## 2024-08-02 NOTE — HEALTH RISK ASSESSMENT
[Good] : ~his/her~  mood as  good [No] : In the past 12 months have you used drugs other than those required for medical reasons? No [Never] : Never [NO] : No [FreeTextEntry1] : health maintenance [0] : 2) Feeling down, depressed, or hopeless: Not at all (0) [PHQ-2 Negative - No further assessment needed] : PHQ-2 Negative - No further assessment needed [[id number]] : 0

## 2024-08-02 NOTE — HEALTH RISK ASSESSMENT
[Good] : ~his/her~  mood as  good [No] : In the past 12 months have you used drugs other than those required for medical reasons? No [Never] : Never [NO] : No [FreeTextEntry1] : health maintenance [0] : 2) Feeling down, depressed, or hopeless: Not at all (0) [PHQ-2 Negative - No further assessment needed] : PHQ-2 Negative - No further assessment needed [[id number]] : 0

## 2024-08-02 NOTE — ASSESSMENT
[FreeTextEntry1] : Annual Physical Exam: chronic low back pain, unspecified whether sciatica, hair loss - BP is stable. Continue current management. - Check A1c, lipid panels, vitamin levels, urine analysis, TSH, folate.  - Rx finasteride 1mg- For hair loss - Referral for [facility], Dr. Dominik Joshi   - RTO annually or as needed.   Pt verbalized understanding and will reach should any questions/concerns occur.

## 2024-08-02 NOTE — PHYSICAL EXAM
[No Acute Distress] : no acute distress [Well Nourished] : well nourished [Well Developed] : well developed [Well-Appearing] : well-appearing [Normal Sclera/Conjunctiva] : normal sclera/conjunctiva [PERRL] : pupils equal round and reactive to light [EOMI] : extraocular movements intact [Normal Outer Ear/Nose] : the outer ears and nose were normal in appearance [Normal Oropharynx] : the oropharynx was normal [No JVD] : no jugular venous distention [No Lymphadenopathy] : no lymphadenopathy [Supple] : supple [Thyroid Normal, No Nodules] : the thyroid was normal and there were no nodules present [No Respiratory Distress] : no respiratory distress  [No Accessory Muscle Use] : no accessory muscle use [Clear to Auscultation] : lungs were clear to auscultation bilaterally [Normal Rate] : normal rate  [Regular Rhythm] : with a regular rhythm [Normal S1, S2] : normal S1 and S2 [No Murmur] : no murmur heard [No Carotid Bruits] : no carotid bruits [No Abdominal Bruit] : a ~M bruit was not heard ~T in the abdomen [No Varicosities] : no varicosities [Pedal Pulses Present] : the pedal pulses are present [No Edema] : there was no peripheral edema [No Palpable Aorta] : no palpable aorta [No Extremity Clubbing/Cyanosis] : no extremity clubbing/cyanosis [Soft] : abdomen soft [Non Tender] : non-tender [Non-distended] : non-distended [No Masses] : no abdominal mass palpated [No HSM] : no HSM [Normal Bowel Sounds] : normal bowel sounds [Normal Posterior Cervical Nodes] : no posterior cervical lymphadenopathy [Normal Anterior Cervical Nodes] : no anterior cervical lymphadenopathy [No CVA Tenderness] : no CVA  tenderness [No Spinal Tenderness] : no spinal tenderness [No Joint Swelling] : no joint swelling [Grossly Normal Strength/Tone] : grossly normal strength/tone [No Rash] : no rash [Coordination Grossly Intact] : coordination grossly intact [No Focal Deficits] : no focal deficits [Normal Gait] : normal gait [Deep Tendon Reflexes (DTR)] : deep tendon reflexes were 2+ and symmetric [Normal Affect] : the affect was normal [Normal Insight/Judgement] : insight and judgment were intact [de-identified] : Hairline receding thinning hair on the scalp

## 2024-08-02 NOTE — HISTORY OF PRESENT ILLNESS
[FreeTextEntry1] : Patient presents for a comprehensive [de-identified] : Patient is a 23 yr old male present today for a   Patient is doing well overall and has not gotten sick since last visit. Seen by GI, prescribed omeprazole and OTC Pepcid, scheduled to follow-up mid-month to discuss completion of meds. Pt reports watching diet. MRI Face, stable, given s/p right sinonasal mass resection in 2021. Sinuses, still intermittent, worse at night.  Pt reports remaining active by walking.  Pt has no acute concerns. Patient reports everything is stable. Some manageable anxiety or depression.  Denies any CP, chest tightness or SOB. Denies any abdominal pain, urinary symptom, or change in bowel habits. Denies any fever, chills, or night sweats.

## 2024-08-02 NOTE — PHYSICAL EXAM
[No Acute Distress] : no acute distress [Well Nourished] : well nourished [Well Developed] : well developed [Well-Appearing] : well-appearing [Normal Sclera/Conjunctiva] : normal sclera/conjunctiva [PERRL] : pupils equal round and reactive to light [EOMI] : extraocular movements intact [Normal Outer Ear/Nose] : the outer ears and nose were normal in appearance [Normal Oropharynx] : the oropharynx was normal [No JVD] : no jugular venous distention [No Lymphadenopathy] : no lymphadenopathy [Supple] : supple [Thyroid Normal, No Nodules] : the thyroid was normal and there were no nodules present [No Respiratory Distress] : no respiratory distress  [No Accessory Muscle Use] : no accessory muscle use [Clear to Auscultation] : lungs were clear to auscultation bilaterally [Normal Rate] : normal rate  [Regular Rhythm] : with a regular rhythm [Normal S1, S2] : normal S1 and S2 [No Murmur] : no murmur heard [No Carotid Bruits] : no carotid bruits [No Abdominal Bruit] : a ~M bruit was not heard ~T in the abdomen [No Varicosities] : no varicosities [Pedal Pulses Present] : the pedal pulses are present [No Edema] : there was no peripheral edema [No Palpable Aorta] : no palpable aorta [No Extremity Clubbing/Cyanosis] : no extremity clubbing/cyanosis [Soft] : abdomen soft [Non Tender] : non-tender [Non-distended] : non-distended [No Masses] : no abdominal mass palpated [No HSM] : no HSM [Normal Bowel Sounds] : normal bowel sounds [Normal Posterior Cervical Nodes] : no posterior cervical lymphadenopathy [Normal Anterior Cervical Nodes] : no anterior cervical lymphadenopathy [No CVA Tenderness] : no CVA  tenderness [No Spinal Tenderness] : no spinal tenderness [No Joint Swelling] : no joint swelling [Grossly Normal Strength/Tone] : grossly normal strength/tone [No Rash] : no rash [Coordination Grossly Intact] : coordination grossly intact [No Focal Deficits] : no focal deficits [Normal Gait] : normal gait [Deep Tendon Reflexes (DTR)] : deep tendon reflexes were 2+ and symmetric [Normal Affect] : the affect was normal [Normal Insight/Judgement] : insight and judgment were intact [de-identified] : Hairline receding thinning hair on the scalp

## 2024-08-02 NOTE — HISTORY OF PRESENT ILLNESS
[FreeTextEntry1] : Patient presents for a comprehensive [de-identified] : Patient is a 23 yr old male present today for a   Patient is doing well overall and has not gotten sick since last visit. Seen by GI, prescribed omeprazole and OTC Pepcid, scheduled to follow-up mid-month to discuss completion of meds. Pt reports watching diet. MRI Face, stable, given s/p right sinonasal mass resection in 2021. Sinuses, still intermittent, worse at night.  Pt reports remaining active by walking.  Pt has no acute concerns. Patient reports everything is stable. Some manageable anxiety or depression.  Denies any CP, chest tightness or SOB. Denies any abdominal pain, urinary symptom, or change in bowel habits. Denies any fever, chills, or night sweats.

## 2024-08-02 NOTE — PHYSICAL EXAM
[No Acute Distress] : no acute distress [Well Nourished] : well nourished [Well Developed] : well developed [Well-Appearing] : well-appearing [Normal Sclera/Conjunctiva] : normal sclera/conjunctiva [PERRL] : pupils equal round and reactive to light [EOMI] : extraocular movements intact [Normal Outer Ear/Nose] : the outer ears and nose were normal in appearance [Normal Oropharynx] : the oropharynx was normal [No JVD] : no jugular venous distention [No Lymphadenopathy] : no lymphadenopathy [Supple] : supple [Thyroid Normal, No Nodules] : the thyroid was normal and there were no nodules present [No Respiratory Distress] : no respiratory distress  [No Accessory Muscle Use] : no accessory muscle use [Clear to Auscultation] : lungs were clear to auscultation bilaterally [Normal Rate] : normal rate  [Regular Rhythm] : with a regular rhythm [Normal S1, S2] : normal S1 and S2 [No Murmur] : no murmur heard [No Carotid Bruits] : no carotid bruits [No Abdominal Bruit] : a ~M bruit was not heard ~T in the abdomen [No Varicosities] : no varicosities [Pedal Pulses Present] : the pedal pulses are present [No Edema] : there was no peripheral edema [No Palpable Aorta] : no palpable aorta [No Extremity Clubbing/Cyanosis] : no extremity clubbing/cyanosis [Soft] : abdomen soft [Non Tender] : non-tender [Non-distended] : non-distended [No Masses] : no abdominal mass palpated [No HSM] : no HSM [Normal Bowel Sounds] : normal bowel sounds [Normal Posterior Cervical Nodes] : no posterior cervical lymphadenopathy [Normal Anterior Cervical Nodes] : no anterior cervical lymphadenopathy [No CVA Tenderness] : no CVA  tenderness [No Spinal Tenderness] : no spinal tenderness [No Joint Swelling] : no joint swelling [Grossly Normal Strength/Tone] : grossly normal strength/tone [No Rash] : no rash [Coordination Grossly Intact] : coordination grossly intact [No Focal Deficits] : no focal deficits [Normal Gait] : normal gait [Deep Tendon Reflexes (DTR)] : deep tendon reflexes were 2+ and symmetric [Normal Affect] : the affect was normal [Normal Insight/Judgement] : insight and judgment were intact [de-identified] : Hairline receding thinning hair on the scalp

## 2024-08-02 NOTE — ADDENDUM
[FreeTextEntry1] : I, Jessica Kauffman, acted as a scribe on behalf of Dr. Ed Abdi MD, on 08/01/2024.   All medical entries made by the scribe were at my, Dr. Ed Abdi MD, direction and personally dictated by me on 08/01/2024. I have reviewed the chart and agree that the record accurately reflects my personal performance of the history, physical exam, assessment and plan. I have also personally directed, reviewed, and agreed with the chart.

## 2024-08-07 ENCOUNTER — NON-APPOINTMENT (OUTPATIENT)
Age: 24
End: 2024-08-07

## 2024-08-07 LAB
25(OH)D3 SERPL-MCNC: 55.4 NG/ML
ALBUMIN SERPL ELPH-MCNC: 5 G/DL
ALP BLD-CCNC: 105 U/L
ALT SERPL-CCNC: 14 U/L
ANION GAP SERPL CALC-SCNC: 14 MMOL/L
APPEARANCE: CLEAR
AST SERPL-CCNC: 20 U/L
BASOPHILS # BLD AUTO: 0.01 K/UL
BASOPHILS NFR BLD AUTO: 0.2 %
BILIRUB SERPL-MCNC: 0.7 MG/DL
BILIRUBIN URINE: NEGATIVE
BLOOD URINE: NEGATIVE
BUN SERPL-MCNC: 11 MG/DL
CALCIUM SERPL-MCNC: 10.1 MG/DL
CHLORIDE SERPL-SCNC: 104 MMOL/L
CHOLEST SERPL-MCNC: 125 MG/DL
CO2 SERPL-SCNC: 24 MMOL/L
COLOR: YELLOW
CREAT SERPL-MCNC: 0.95 MG/DL
EGFR: 117 ML/MIN/1.73M2
EOSINOPHIL # BLD AUTO: 0.08 K/UL
EOSINOPHIL NFR BLD AUTO: 1.6 %
ESTIMATED AVERAGE GLUCOSE: 100 MG/DL
FOLATE SERPL-MCNC: 12.9 NG/ML
GLUCOSE QUALITATIVE U: NEGATIVE MG/DL
GLUCOSE SERPL-MCNC: 91 MG/DL
HBA1C MFR BLD HPLC: 5.1 %
HCT VFR BLD CALC: 53.7 %
HDLC SERPL-MCNC: 59 MG/DL
HGB BLD-MCNC: 16.9 G/DL
IMM GRANULOCYTES NFR BLD AUTO: 0.2 %
KETONES URINE: NEGATIVE MG/DL
LDLC SERPL CALC-MCNC: 50 MG/DL
LEUKOCYTE ESTERASE URINE: NEGATIVE
LYMPHOCYTES # BLD AUTO: 1.62 K/UL
LYMPHOCYTES NFR BLD AUTO: 32.7 %
MAN DIFF?: NORMAL
MCHC RBC-ENTMCNC: 30.5 PG
MCHC RBC-ENTMCNC: 31.5 GM/DL
MCV RBC AUTO: 96.9 FL
MONOCYTES # BLD AUTO: 0.49 K/UL
MONOCYTES NFR BLD AUTO: 9.9 %
NEUTROPHILS # BLD AUTO: 2.75 K/UL
NEUTROPHILS NFR BLD AUTO: 55.4 %
NITRITE URINE: NEGATIVE
NONHDLC SERPL-MCNC: 66 MG/DL
PH URINE: 6.5
PLATELET # BLD AUTO: 262 K/UL
POTASSIUM SERPL-SCNC: 4.5 MMOL/L
PROT SERPL-MCNC: 7.6 G/DL
PROTEIN URINE: NEGATIVE MG/DL
RBC # BLD: 5.54 M/UL
RBC # FLD: 12 %
SODIUM SERPL-SCNC: 143 MMOL/L
SPECIFIC GRAVITY URINE: 1.01
TRIGL SERPL-MCNC: 79 MG/DL
TSH SERPL-ACNC: 2.92 UIU/ML
UROBILINOGEN URINE: 0.2 MG/DL
VIT B12 SERPL-MCNC: 586 PG/ML
WBC # FLD AUTO: 4.96 K/UL

## 2024-08-13 ENCOUNTER — TRANSCRIPTION ENCOUNTER (OUTPATIENT)
Age: 24
End: 2024-08-13